# Patient Record
Sex: MALE | Race: WHITE | NOT HISPANIC OR LATINO | Employment: FULL TIME | URBAN - METROPOLITAN AREA
[De-identification: names, ages, dates, MRNs, and addresses within clinical notes are randomized per-mention and may not be internally consistent; named-entity substitution may affect disease eponyms.]

---

## 2023-07-21 ENCOUNTER — TELEPHONE (OUTPATIENT)
Dept: TRANSPLANT | Facility: CLINIC | Age: 58
End: 2023-07-21

## 2023-07-21 NOTE — TELEPHONE ENCOUNTER
Spoke to wife. Pt hx of Colon cancer, kidney cancer, and liver cancer diagnosed  7/2022. Per wife colon resection, partial kidney nephrectomy was done. For the mets to liver only receiving Chemo. She stated imaging of the liver showed wide spread lesions in the liver and he was ruled out for resection.  Pt does not have insurance. Wife stated she is applying for spin down. All care the pt received is [facility] financial assistance [email or web address]. Explained to wife I will need to obtain all records from [facility] and the oncologist. She stated Xiang Yousif RN case mgr with [facility] can assist if needed. Informed that on Monday I will reach out to  the providers.

## 2023-07-21 NOTE — TELEPHONE ENCOUNTER
----- Message from Wei Gole RN sent at 7/21/2023  4:02 PM CDT -----  Regarding: FW: Consult/Advisory  Contact: [phone]  Was unsure of where to send this message  ----- Message -----  From: Greer Franco  Sent: 7/21/2023   9:06 AM CDT  To: [facility] Pre-Liver Transplant Clinical  Subject: Consult/Advisory                                 CONSULT/ADVISORY    Name of Caller: Aliza Ruiz (Spouse)    Contact Preference: [phone]    Nature of Call: Pts spouse is calling to get advice for her  who has Stage 4 Colorectal Cancer, Gallbladder is full of gallstones and the cancer has metastasized  to his liver.  Spouse is requesting a call back.

## 2023-07-26 ENCOUNTER — TELEPHONE (OUTPATIENT)
Dept: TRANSPLANT | Facility: CLINIC | Age: 58
End: 2023-07-26

## 2023-07-26 NOTE — TELEPHONE ENCOUNTER
----- Message from Donovan Oreilly sent at 7/26/2023  3:26 PM CDT -----  Regarding: FW: Speak to staff  Contact: Nataliia    ----- Message -----  From: Yanely Clinton  Sent: 7/26/2023   3:21 PM CDT  To: [facility] Pre-Liver Transplant Non-Clinical  Subject: Speak to staff                                   Regarding: Speak to staff        Name Of Caller: Nataliia        Contact Preference: Nataliia Ruiz (Spouse)   [phone]              Nature of call:  pt spouse is calling to speak to staff regarding Pt status. Please advise.

## 2023-07-28 ENCOUNTER — TELEPHONE (OUTPATIENT)
Dept: TRANSPLANT | Facility: CLINIC | Age: 58
End: 2023-07-28

## 2023-07-28 NOTE — TELEPHONE ENCOUNTER
Call placed to MA of DR [facility] for medical records. Lvm. Spoke to wife today informed pending records. She will send his kidney pathology and CT report of the liver.

## 2023-07-28 NOTE — TELEPHONE ENCOUNTER
----- Message from Raven Larsen sent at 7/28/2023  2:33 PM CDT -----  Regarding: FW: Speak to coordinator  Contact: Mitra    ----- Message -----  From: Yanely Clinton  Sent: 7/28/2023   2:27 PM CDT  To: [facility] Pre-Liver Transplant Non-Clinical  Subject: Speak to coordinator                             Regarding:speak to staff          Name Of Caller: Mitra        Contact Preference: [phone]     Nature of call: Mitra from [facility] is calling to speak to Selina regarding medical records  being faxed over for pt, she states that it is over 400 pages, and they can only fax 50. Please advise. Requesting a call back.

## 2023-07-31 ENCOUNTER — TELEPHONE (OUTPATIENT)
Dept: TRANSPLANT | Facility: CLINIC | Age: 58
End: 2023-07-31

## 2023-07-31 NOTE — TELEPHONE ENCOUNTER
Spoke to office staff of Dr [facility] they will send over the records requested. Called pt, mailbox full, unable to lvm.

## 2023-07-31 NOTE — TELEPHONE ENCOUNTER
Spoke to pt's wife informed per Dr Ponce the pt will need to be seen in Oncology for metastatic colon to liver. Pt is still without insurance but in speak with Oncology they will see him if he qualifies for pt financial assistance. Wife informed to call as soon as pt as insurance or assistance. Wife stated she is looking into [employer]. She will call when she has more information.

## 2023-08-07 ENCOUNTER — TELEPHONE (OUTPATIENT)
Dept: TRANSPLANT | Facility: CLINIC | Age: 58
End: 2023-08-07

## 2023-08-07 NOTE — TELEPHONE ENCOUNTER
Per wife she did complete the financial aid application at pt assistance with [facility]. She stated they also heard from the State and was told they would qualify for spin down if they could provide bills. She reported that the bill from [facility] helps them qualify. She feels they are making good progress with trying to get coverage. I suggested that she call me as soon gets any information re coverage.

## 2023-08-07 NOTE — TELEPHONE ENCOUNTER
----- Message from Corey Summers sent at 8/7/2023  1:09 PM CDT -----  Regarding: call back  Pt's wife call to speak with Selina in regards to some question she has      Call

## 2023-08-15 ENCOUNTER — TELEPHONE (OUTPATIENT)
Dept: HEMATOLOGY/ONCOLOGY | Facility: CLINIC | Age: 58
End: 2023-08-15

## 2023-08-15 NOTE — TELEPHONE ENCOUNTER
----- Message from Candida Quintana RN sent at 8/14/2023  4:41 PM CDT -----  Regarding: FW: Appt  Contact: Pt [phone]    ----- Message -----  From: Flower Hanks  Sent: 8/14/2023   3:56 PM CDT  To: [facility] Cancer Navigation  Subject: Appt                                                           Appt Type:  Np     Date/Time Preference:    Next mellissa      Treating Provider:   New Patient     Caller Name: Nataliia     Contact Prefer:  [phone]    Comments/Notes:   Called to schedule appt on Pt behalf

## 2023-08-16 ENCOUNTER — TELEPHONE (OUTPATIENT)
Dept: SURGERY | Facility: CLINIC | Age: 58
End: 2023-08-16

## 2023-08-21 ENCOUNTER — TELEPHONE (OUTPATIENT)
Dept: HEMATOLOGY/ONCOLOGY | Facility: CLINIC | Age: 58
End: 2023-08-21

## 2023-08-24 ENCOUNTER — OFFICE VISIT (OUTPATIENT)
Dept: HEMATOLOGY/ONCOLOGY | Facility: CLINIC | Age: 58
End: 2023-08-24

## 2023-08-24 ENCOUNTER — OFFICE VISIT (OUTPATIENT)
Dept: SURGERY | Facility: CLINIC | Age: 58
End: 2023-08-24

## 2023-08-24 VITALS
OXYGEN SATURATION: 97 % | WEIGHT: 227.06 LBS | DIASTOLIC BLOOD PRESSURE: 68 MMHG | HEIGHT: 71 IN | RESPIRATION RATE: 18 BRPM | BODY MASS INDEX: 31.79 KG/M2 | SYSTOLIC BLOOD PRESSURE: 133 MMHG | HEART RATE: 91 BPM | TEMPERATURE: 97 F

## 2023-08-24 DIAGNOSIS — C78.7 SECONDARY MALIGNANT NEOPLASM OF LIVER: ICD-10-CM

## 2023-08-24 DIAGNOSIS — C64.1 MALIGNANT NEOPLASM OF RIGHT KIDNEY, EXCEPT RENAL PELVIS: ICD-10-CM

## 2023-08-24 DIAGNOSIS — C18.3 MALIGNANT NEOPLASM OF HEPATIC FLEXURE: Primary | ICD-10-CM

## 2023-08-24 DIAGNOSIS — D84.81 IMMUNODEFICIENCY SECONDARY TO NEOPLASM: ICD-10-CM

## 2023-08-24 DIAGNOSIS — D49.9 IMMUNODEFICIENCY SECONDARY TO NEOPLASM: ICD-10-CM

## 2023-08-24 DIAGNOSIS — C78.7 SECONDARY MALIGNANT NEOPLASM OF LIVER: Primary | ICD-10-CM

## 2023-08-24 PROBLEM — R53.83 FATIGUE: Status: ACTIVE | Noted: 2019-07-08

## 2023-08-24 PROBLEM — E56.9 VITAMIN DEFICIENCY: Status: ACTIVE | Noted: 2017-03-06

## 2023-08-24 PROBLEM — F33.0 MILD EPISODE OF RECURRENT MAJOR DEPRESSIVE DISORDER: Status: ACTIVE | Noted: 2023-08-24

## 2023-08-24 PROBLEM — K30 FUNCTIONAL DYSPEPSIA: Status: ACTIVE | Noted: 2018-02-13

## 2023-08-24 PROBLEM — Z86.39 PERSONAL HISTORY OF OTHER ENDOCRINE, NUTRITIONAL AND METABOLIC DISEASE: Status: ACTIVE | Noted: 2023-08-24

## 2023-08-24 PROBLEM — F32.A DEPRESSION: Status: ACTIVE | Noted: 2023-08-24

## 2023-08-24 PROBLEM — Z00.00 ENCOUNTER FOR HEALTH MAINTENANCE EXAMINATION: Status: ACTIVE | Noted: 2018-06-27

## 2023-08-24 PROBLEM — I10 PRIMARY HYPERTENSION: Status: ACTIVE | Noted: 2017-03-06

## 2023-08-24 PROBLEM — E11.65 HYPERGLYCEMIA DUE TO TYPE 2 DIABETES MELLITUS: Status: ACTIVE | Noted: 2019-07-08

## 2023-08-24 PROBLEM — R68.89 OTHER GENERAL SYMPTOMS AND SIGNS: Status: ACTIVE | Noted: 2019-07-08

## 2023-08-24 PROBLEM — E78.00 PURE HYPERCHOLESTEROLEMIA: Status: ACTIVE | Noted: 2017-03-06

## 2023-08-24 PROBLEM — E11.9 TYPE 2 DIABETES MELLITUS WITHOUT COMPLICATION: Status: ACTIVE | Noted: 2017-03-06

## 2023-08-24 PROCEDURE — 99999 PR PBB SHADOW E&M-EST. PATIENT-LVL IV: CPT | Mod: PBBFAC,,, | Performed by: REGISTERED NURSE

## 2023-08-24 PROCEDURE — 99999 PR PBB SHADOW E&M-EST. PATIENT-LVL I: CPT | Mod: PBBFAC,,, | Performed by: SURGERY

## 2023-08-24 PROCEDURE — 99214 OFFICE O/P EST MOD 30 MIN: CPT | Mod: PBBFAC | Performed by: REGISTERED NURSE

## 2023-08-24 PROCEDURE — 99204 PR OFFICE/OUTPT VISIT, NEW, LEVL IV, 45-59 MIN: ICD-10-PCS | Mod: S$PBB,,, | Performed by: SURGERY

## 2023-08-24 PROCEDURE — 99205 PR OFFICE/OUTPT VISIT, NEW, LEVL V, 60-74 MIN: ICD-10-PCS | Mod: S$PBB,,, | Performed by: REGISTERED NURSE

## 2023-08-24 PROCEDURE — 99999 PR PBB SHADOW E&M-EST. PATIENT-LVL IV: ICD-10-PCS | Mod: PBBFAC,,, | Performed by: REGISTERED NURSE

## 2023-08-24 PROCEDURE — 99211 OFF/OP EST MAY X REQ PHY/QHP: CPT | Mod: PBBFAC,27 | Performed by: SURGERY

## 2023-08-24 PROCEDURE — 99204 OFFICE O/P NEW MOD 45 MIN: CPT | Mod: S$PBB,,, | Performed by: SURGERY

## 2023-08-24 PROCEDURE — 99999 PR PBB SHADOW E&M-EST. PATIENT-LVL I: ICD-10-PCS | Mod: PBBFAC,,, | Performed by: SURGERY

## 2023-08-24 PROCEDURE — 99205 OFFICE O/P NEW HI 60 MIN: CPT | Mod: S$PBB,,, | Performed by: REGISTERED NURSE

## 2023-08-24 RX ORDER — TEMAZEPAM 15 MG/1
15-30 CAPSULE ORAL NIGHTLY PRN
COMMUNITY
Start: 2023-06-12

## 2023-08-24 RX ORDER — ERGOCALCIFEROL 1.25 MG/1
25000 CAPSULE ORAL
COMMUNITY

## 2023-08-24 RX ORDER — GLYBURIDE 5 MG/1
TABLET ORAL
COMMUNITY

## 2023-08-24 RX ORDER — INSULIN GLARGINE 100 [IU]/ML
INJECTION, SOLUTION SUBCUTANEOUS
COMMUNITY
Start: 2023-06-28

## 2023-08-24 RX ORDER — HYOSCYAMINE SULFATE 0.12 MG/1
0.12 TABLET SUBLINGUAL EVERY 4 HOURS
COMMUNITY
Start: 2023-08-07

## 2023-08-24 RX ORDER — METFORMIN HYDROCHLORIDE 1000 MG/1
1000 TABLET ORAL 2 TIMES DAILY
COMMUNITY
Start: 2023-06-28

## 2023-08-24 RX ORDER — MIRTAZAPINE 15 MG/1
15 TABLET, FILM COATED ORAL NIGHTLY
COMMUNITY
Start: 2023-05-19

## 2023-08-24 RX ORDER — CARVEDILOL 6.25 MG/1
TABLET ORAL
COMMUNITY

## 2023-08-24 RX ORDER — HYDROCODONE BITARTRATE AND ACETAMINOPHEN 10; 325 MG/1; MG/1
1 TABLET ORAL 4 TIMES DAILY PRN
COMMUNITY
Start: 2023-08-07

## 2023-08-24 RX ORDER — SIMVASTATIN 20 MG/1
20 TABLET, FILM COATED ORAL
COMMUNITY
Start: 2023-06-28

## 2023-08-24 RX ORDER — TAMSULOSIN HYDROCHLORIDE 0.4 MG/1
1 CAPSULE ORAL
COMMUNITY
Start: 2023-05-30

## 2023-08-24 RX ORDER — RAMIPRIL 5 MG/1
5 CAPSULE ORAL
COMMUNITY
Start: 2023-05-30

## 2023-08-24 RX ORDER — CAPECITABINE 500 MG/1
TABLET, FILM COATED ORAL
COMMUNITY
Start: 2023-03-02

## 2023-08-24 RX ORDER — DIPHENOXYLATE HYDROCHLORIDE AND ATROPINE SULFATE 2.5; .025 MG/1; MG/1
1 TABLET ORAL EVERY 6 HOURS PRN
COMMUNITY
Start: 2023-06-21

## 2023-08-24 NOTE — PROGRESS NOTES
SURGICAL ONCOLOGY CLINIC H&P    Subjective:     Elias Ruiz is a 58 y.o. male with PMH of DM, HTN, HLD, Clear Cell on right kidney, and CRC at the hepatic flexure s/p partial colectomy with anastomosis on 7/20/2022. Pt also had liver lesions that were biopsied and proven to have metastatic adenocarcinoma. Pathology of tissue revealed poorly differenetiated adenocarcinoma of the colon pT3, all margins were neagtive, 3/18 lymph nodes were positive for malignancy. Pathology from partial nephrectomy showed grade 1 clear-cell carcinoma measuring 1.5cm all margins clear. Around 9/2022 he started chemotherapy (see below), overall he seems to be tolerating it well with mild diarrhea, nausea, and night sweats however the cancer seems to have progressed. On 7/24/2023 he had a cholecystectomy which showed CRC involvement. Pt presents today for discussion of further treatment options.    Overall, he reports that he is doing well today. Denies N/V. Pt reports that he has not been eating much < 800 calories a day. Reports that he has pain about once a week, pain runs across the belly up to the right side, pain meds help (hydrocodone, ibuprofen, and OTC). 20 lb weight loss in the past couple months. No fevers or chills. Admits night sweats. Having trouble sleeping due to running out of restoril 15mg.    Per pt records on phone AST/ALT 45/39. AFP normal. CEA 7.09 on 8/7/23.Bilirubin 0.5.     Chemotherapy Hx:  9/22-11/22 - FOLFOX + Avastin x 5 cycles - port infection/removal prior to C6  1/23-3/23 - xeloda + kayla maintenance (believes 6 cycles - restaging showed progression of disease)   4/23-7/23 - FOLFOX + Avastin (port infection after 2nd treatment, received 3 treatments through PICC, 6th treatment with thrombocytopenia, delayed, then had reaction when restarted)    PMH: DM, HTN, HLD, Clear Cell carcinoma, atypical depression    Past Surgical History: Partial Colectomy (CRC), Partial nephrectomy, cholecystectomy, R knee  surgery     Social History:  Social History     Socioeconomic History    Marital status:    Tobacco Use    Smoking status: Former     Current packs/day: 0.00     Types: Cigarettes    Smokeless tobacco: Former   Former Smoker: 30 years (15 year old started) about 1.5 packs a day average    Family History: Dad: lung cancer    Allergies:   Review of patient's allergies indicates:   Allergen Reactions    Penicillins      Was told from [facility]       Medications:  Current Outpatient Medications on File Prior to Visit   Medication Sig Dispense Refill    carvediloL (COREG) 6.25 MG tablet 1 tablet by mouth twice a day for 90      diphenoxylate-atropine 2.5-0.025 mg (LOMOTIL) 2.5-0.025 mg per tablet Take 1 tablet by mouth every 6 (six) hours as needed.      ergocalciferol (ERGOCALCIFEROL) 50,000 unit Cap Take 25,000 Units by mouth.      glyBURIDE (DIABETA) 5 MG tablet 1 tablet by mouth daily for 30      HYDROcodone-acetaminophen (NORCO)  mg per tablet Take 1 tablet by mouth 4 (four) times daily as needed.      hyoscyamine (LEVSIN/SL) 0.125 mg Subl Place 0.125 mg under the tongue every 4 (four) hours.      LANTUS SOLOSTAR U-100 INSULIN glargine 100 units/mL SubQ pen SMARTSI Unit(s) SUB-Q Twice Daily      metFORMIN (GLUCOPHAGE) 1000 MG tablet Take 1,000 mg by mouth 2 (two) times daily.      mirtazapine (REMERON) 15 MG tablet Take 15 mg by mouth every evening.      ramipriL (ALTACE) 5 MG capsule Take 5 mg by mouth.      simvastatin (ZOCOR) 20 MG tablet Take 20 mg by mouth.      tamsulosin (FLOMAX) 0.4 mg Cap Take 1 capsule by mouth.      temazepam (RESTORIL) 15 mg Cap Take 15-30 mg by mouth nightly as needed.      XELODA 500 mg Tab SMARTSI By Mouth Every 12 Hours       No current facility-administered medications on file prior to visit.   Metformin 1000mg BID        Objective:     Vital Signs (Most Recent)       ROS A 10+ review of systems was performed with pertinent positives and negatives noted above in  the history of present illness.  Other systems were negative unless otherwise specified.    Physical Exam:   Gen: awake, alert, in no acute distress  HEENT: normocephalic, atraumatic, EOMI, no scleral icterus  CV: regular rate and rhythm  Pulm: equal chest rise bilaterally, normal work of breathing  Abd:  soft, non-tender, no guarding, no hepatomegaly  Ext: WWP, skin warm and dry    Imaging  The following imaging was reviewed: CT Abd/Pelvis  Per Reports:  PET on 3/15/23 showed SUV uptake values in the right hepatic lobe 10.5 and 11.4    Other Diagnostic Studies:    Assessment:     Elias Ruiz is a 58 y.o. male with metastatic CRC that continues to progress despite chemotherapy. Imaging was reviewed with the patient and we discussed that currently there is no liver resection options due to involvement in all lobes of the liver. We discussed his options and included hepatic artery infusion due to patient questioning but unfortunately we do not offer it here at [facility]. This will have to be a case discussed with the tumor board with oncology, transplant surgery, and interventional radiology.    Plan:     - discuss case with tumor board  - f/u with oncology for chemotherapy options  - currently not a liver resection candidate    Rahul Anderson MD   [facility] PGY-1

## 2023-08-24 NOTE — PROGRESS NOTES
[facility]  Colorectal Cancer Liver Metastases Program Intake Note      Patient: Elias Ruiz  Demographics: 58 y.o. male   Referring provider: [facility] - Dr. Cecilio Rudd  In person or virtual visit: In person  Date of visit: 08/24/2023    Co-morbid diagnoses: HTN, HLD, DM, grade 1 clear cell renal carcinoma, 1.5 cm, s/p partial nephrectomy 7/20/22  ECOG Performance Status: 1    Oncologic History:    Date of original cancer diagnosis: 7/2022  Timing of metastatic presentation: Synchronous  Primary disease site: hepatic flexure    Primary in place or resected: Resected  - Pathology of primary resection: pT3 pN1b M1a. Poorly differentiated invasive adenocarcinoma. Negative margins. 3/18 +LNs.     7/24/23 - gallbladder, cholecystectomy: metastatic colorectal adenocarcinoma involving gallbladder bed of resection.   - chronic cholecystitis and cholelithiasis    Chemotherapy regimens and dates:   9/22-11/22 - FOLFOX + Avastin x 5 cycles - port infection/removal prior to C6  1/23-3/23 - xeloda + kayla maintenance (believes 6 cycles - restaging showed progression of disease)   4/23-7/23 - FOLFOX + Avastin (port infection after 2nd treatment, received 3 treatments through PICC, 6th treatment with thrombocytopenia, delayed, then had reaction when restarted)    Prior cancer surgeries or liver-directed therapies:   7/20/23 - right hemicolectomy, resection of omental biopsy, liver biopsy, partial nephrectomy    Most recent imaging & date:   7/20/23 - CT CAP  Impression:   Progressing widespread liver metastases.   Status post right-sided hemicolectomy.  Incidental cholelithiasis.  Additional postoperative and chronic type CT findings as above, with no additional acute abnormality or significant change from prior.     3/15/23 - PET CT  Impression:  Interval metastatic disease progression compared to prior, with interval development of pathologic FDG localization at multiple liver metastases  new from previous.     Tumor Characteristics:    EBONI: TBD  Microsatellite status: MAHNAZ  Molecular profiling: Caris done - no report available in current records   Most recent CEA: 22 - 8.40. 7 - 7.09     Plan    Other notes: Seen for annual check up early 2022 but asymptomatic. ~1 week later, he presented to ED for abdominal pain. Imaging revealed stage IV colorectal cancer with synchronous liver mets and renal mass concerning for malignancy. With recent progression, recommended transitioning to FOLFIRI, but patient opted out d/t side effects of irinotecan. Currently has intermittent diarrhea, has had 3 episodes today. Main treatment side effects: port infections, diarrhea, pain, decreased appetite, cytopenias.     SH:   to Nataliia  No children  Retired from offshore  Former tobacco (starting chew/dip tobacco around 7-7 y/o, quit ~51 y/o; cigarettes started ~12-16 y/o, quit ~44 y/o - smoked 1.5-2 ppd)  EtOH - started @ 16 y/o, quit ~22 y/o - now with occasional/social use    FH:   Father - lung cancer @ 73, throat cancer? (Possibly side effect of radiation) -  @ 77  No other known cancers in the family    Disposition: Will present at next [facility]CRC tumor board. Will try to obtain Caris NGS report as patient reports done previously and may assist in future treatment recommendations.     Patient is in agreement with the proposed treatment plan. All questions were answered to the patient's satisfaction. Pt knows to call clinic if anything is needed before the next clinic visit.    Patient discussed with collaborating physician, Dr. Osorio.    At least 60 minutes were spent today on this encounter including face to face time with the patient, data gathering/interpretation and documentation.       Nely Calix, MSN, APRN, ACCNS-AG  Hematology and Medical Oncology  Clinical Nurse Specialist to Dr. Osorio, Dr. Yoon & Dr. Gamboa    Route Chart for Scheduling    Med Onc Chart Routing      Follow up  with physician No follow up needed. recs to follow TB discussion   Follow up with [facility]    Infusion scheduling note    Injection scheduling note    Labs    Imaging    Pharmacy appointment    Other referrals

## 2023-09-07 ENCOUNTER — TELEPHONE (OUTPATIENT)
Dept: HEMATOLOGY/ONCOLOGY | Facility: CLINIC | Age: 58
End: 2023-09-07

## 2023-09-07 DIAGNOSIS — C18.3 PRIMARY ADENOCARCINOMA OF ASCENDING COLON AND HEPATIC FLEXURE: ICD-10-CM

## 2023-09-07 DIAGNOSIS — C18.2 PRIMARY ADENOCARCINOMA OF ASCENDING COLON AND HEPATIC FLEXURE: ICD-10-CM

## 2023-09-11 ENCOUNTER — DOCUMENTATION ONLY (OUTPATIENT)
Dept: HEMATOLOGY/ONCOLOGY | Facility: CLINIC | Age: 58
End: 2023-09-11

## 2023-09-11 NOTE — PROGRESS NOTES
[facility]     Colorectal Liver Metastasis Tumor Board Note      Date: 09/7/2023    Case Overview: Mr. Ruiz (58M) was initially diagnosed with adenocarcinoma of the hepatic flexure in 07/2022 with synchronous liver metastases. He has received 5 cycles of FOLFOX + kayla, 6 cycles of Xeloda + kayla, and then another 6 cycles of FOLFOX + kayla prior to R hemicolectomy in 07/2023.     Participants: medical oncology, surgical oncology, colorectal surgery, transplant surgeons, interventional radiology, and oncology navigator     Imaging Reviewed: CT Cap 07/2023    Radiology Review: Interval progression with worsening tumor burden throughout the liver and enlarging portocaval lymph node.    Orders/Referrals: med onc referral    Board Recommendations: Patient progressed on last chemo regimen. Would recommend FOLFIRI as next line, though patient has declined this regimen d/t potential side effect profile. No locoregional options recommended at this time. Will have patient seen in med onc clinic to discuss additional treatment options and potential clinical trials if progresses on FOLFIRI/next line treatment.

## 2023-09-20 ENCOUNTER — OFFICE VISIT (OUTPATIENT)
Dept: HEMATOLOGY/ONCOLOGY | Facility: CLINIC | Age: 58
End: 2023-09-20

## 2023-09-20 DIAGNOSIS — T45.1X5A IMMUNODEFICIENCY DUE TO CHEMOTHERAPY: ICD-10-CM

## 2023-09-20 DIAGNOSIS — Z79.899 IMMUNODEFICIENCY DUE TO CHEMOTHERAPY: ICD-10-CM

## 2023-09-20 DIAGNOSIS — C18.2 PRIMARY ADENOCARCINOMA OF ASCENDING COLON AND HEPATIC FLEXURE: Primary | ICD-10-CM

## 2023-09-20 DIAGNOSIS — E78.00 PURE HYPERCHOLESTEROLEMIA: ICD-10-CM

## 2023-09-20 DIAGNOSIS — Z79.4 TYPE 2 DIABETES MELLITUS WITH HYPERGLYCEMIA, WITH LONG-TERM CURRENT USE OF INSULIN: ICD-10-CM

## 2023-09-20 DIAGNOSIS — D84.821 IMMUNODEFICIENCY DUE TO CHEMOTHERAPY: ICD-10-CM

## 2023-09-20 DIAGNOSIS — C64.1 MALIGNANT NEOPLASM OF RIGHT KIDNEY, EXCEPT RENAL PELVIS: ICD-10-CM

## 2023-09-20 DIAGNOSIS — C18.3 PRIMARY ADENOCARCINOMA OF ASCENDING COLON AND HEPATIC FLEXURE: Primary | ICD-10-CM

## 2023-09-20 DIAGNOSIS — C18.3 MALIGNANT NEOPLASM OF HEPATIC FLEXURE: ICD-10-CM

## 2023-09-20 DIAGNOSIS — D84.81 IMMUNODEFICIENCY SECONDARY TO NEOPLASM: ICD-10-CM

## 2023-09-20 DIAGNOSIS — I10 PRIMARY HYPERTENSION: ICD-10-CM

## 2023-09-20 DIAGNOSIS — E11.65 TYPE 2 DIABETES MELLITUS WITH HYPERGLYCEMIA, WITH LONG-TERM CURRENT USE OF INSULIN: ICD-10-CM

## 2023-09-20 DIAGNOSIS — D49.9 IMMUNODEFICIENCY SECONDARY TO NEOPLASM: ICD-10-CM

## 2023-09-20 PROCEDURE — 99215 OFFICE O/P EST HI 40 MIN: CPT | Mod: 95,,, | Performed by: INTERNAL MEDICINE

## 2023-09-20 PROCEDURE — 99215 PR OFFICE/OUTPT VISIT, EST, LEVL V, 40-54 MIN: ICD-10-PCS | Mod: 95,,, | Performed by: INTERNAL MEDICINE

## 2023-09-20 NOTE — PROGRESS NOTES
The patient location is: [place] - [place]    Visit type: audiovisual    Each patient to whom he or she provides medical services by telemedicine is:  (1) informed of the relationship between the physician and patient and the respective role of any other health care provider with respect to management of the patient; and (2) notified that he or she may decline to receive medical services by telemedicine and may withdraw from such care at any time.      MEDICAL ONCOLOGY - NEW PATIENT VISIT    Reason for visit: Colon cancer    Best Contact Phone Number(s): [phone] (home)      Cancer/Stage/TNM:    Cancer Staging   Primary adenocarcinoma of ascending colon and hepatic flexure  Staging form: Colon and Rectum, AJCC 8th Edition  - Pathologic stage from 7/20/2023: Stage MARISELA (pT3, pN1b, pM1a) - Signed by Shashi Osorio MD on 9/20/2023       Oncology History   Primary adenocarcinoma of ascending colon and hepatic flexure   7/20/2023 Cancer Staged    Staging form: Colon and Rectum, AJCC 8th Edition  - Pathologic stage from 7/20/2023: Stage MARISELA (pT3, pN1b, pM1a)     9/7/2023 Initial Diagnosis    Primary adenocarcinoma of ascending colon and hepatic flexure          HPI:   58 y.o. male with HTN, HLD, T2DM who presents for further management recommendations regarding his metastatic colon cancer. He was initially diagnosed with adenocarcinoma of the hepatic flexure in 07/2022 with synchronous liver metastases. He has received 5 cycles of FOLFOX + kayla, 6 cycles of Xeloda + kayla, and then another 6 cycles of FOLFOX + kayla prior to cholecystectomy for persistent RUQ pain in July 2023.  His last cycle of chemo was June 2023 and he experienced an infusion reaction - likely during oxaliplatin.  He has been hesitant to restart chemotherapy with second line FOLFIRI + kayla due to toxicity concerns.  He had significant diarrhea while on FOLFOX + kayla and twice had his port removed due to infection and once it was eroding through  "skin.     He is experiencing "squeezing pain" in his RUQ, sometimes radiating across the abdomen or into the back.  It is present on and off during the day.  He takes Norco, oxycodone and ibuprofen as needed for the pain.      CARIS testing shows pMMR, ASXL1 and TP53 mutation.  VUS noted in BRCA2.  TMB 3.    Patient presents with his wife, daughter and son-in-law.  ECOG PS is 1.  History has been obtained by chart review and discussion with the patient.    ROS:   Review of Systems   Constitutional:  Positive for malaise/fatigue. Negative for chills, fever and weight loss.   HENT:  Negative for sore throat.    Eyes:  Negative for blurred vision and pain.   Respiratory:  Negative for cough and shortness of breath.    Cardiovascular:  Negative for chest pain and leg swelling.   Gastrointestinal:  Positive for abdominal pain and diarrhea. Negative for constipation, nausea and vomiting.   Genitourinary:  Negative for dysuria and frequency.   Musculoskeletal:  Negative for back pain, falls and myalgias.   Skin:  Negative for rash.   Neurological:  Negative for dizziness, weakness and headaches.   Endo/Heme/Allergies:  Does not bruise/bleed easily.   Psychiatric/Behavioral:  Negative for depression. The patient is not nervous/anxious.    All other systems reviewed and are negative.      Past Medical History:   No past medical history on file.     Past Surgical History:   No past surgical history on file.     Family History:   No family history on file.     Social History:   Social History     Tobacco Use    Smoking status: Former     Types: Cigarettes    Smokeless tobacco: Former   Substance Use Topics    Alcohol use: Not on file        I have reviewed and updated the patient's past medical, surgical, family and social histories.    Allergies:   Review of patient's allergies indicates:   Allergen Reactions    Penicillins      Was told from childood        Medications:   Current Outpatient Medications   Medication Sig " Dispense Refill    carvediloL (COREG) 6.25 MG tablet 1 tablet by mouth twice a day for 90      diphenoxylate-atropine 2.5-0.025 mg (LOMOTIL) 2.5-0.025 mg per tablet Take 1 tablet by mouth every 6 (six) hours as needed.      ergocalciferol (ERGOCALCIFEROL) 50,000 unit Cap Take 25,000 Units by mouth.      glyBURIDE (DIABETA) 5 MG tablet 1 tablet by mouth daily for 30      HYDROcodone-acetaminophen (NORCO)  mg per tablet Take 1 tablet by mouth 4 (four) times daily as needed.      hyoscyamine (LEVSIN/SL) 0.125 mg Subl Place 0.125 mg under the tongue every 4 (four) hours.      LANTUS SOLOSTAR U-100 INSULIN glargine 100 units/mL SubQ pen SMARTSI Unit(s) SUB-Q Twice Daily      metFORMIN (GLUCOPHAGE) 1000 MG tablet Take 1,000 mg by mouth 2 (two) times daily.      mirtazapine (REMERON) 15 MG tablet Take 15 mg by mouth every evening.      ramipriL (ALTACE) 5 MG capsule Take 5 mg by mouth.      simvastatin (ZOCOR) 20 MG tablet Take 20 mg by mouth.      tamsulosin (FLOMAX) 0.4 mg Cap Take 1 capsule by mouth.      temazepam (RESTORIL) 15 mg Cap Take 15-30 mg by mouth nightly as needed.      XELODA 500 mg Tab SMARTSI By Mouth Every 12 Hours       No current facility-administered medications for this visit.        Physical Exam:   There were no vitals taken for this visit.             Exam limited due to virtual nature.  Physical Exam  Constitutional:       General: He is not in acute distress.     Appearance: Normal appearance. He is not ill-appearing.   HENT:      Head: Normocephalic and atraumatic.   Eyes:      General: No scleral icterus.     Extraocular Movements: Extraocular movements intact.      Conjunctiva/sclera: Conjunctivae normal.   Pulmonary:      Effort: Pulmonary effort is normal. No respiratory distress.   Neurological:      Mental Status: He is alert and oriented to person, place, and time.   Psychiatric:         Mood and Affect: Mood normal.         Behavior: Behavior normal.         Thought  Content: Thought content normal.         Judgment: Judgment normal.           Labs:   No results found for this or any previous visit (from the past 48 hour(s)).   No labs available for review    Imaging:     CT from 8/16/23 reviewed and shows multifocal bilobar liver metastases.  Enlarged alistair hepatis lymph node consistent with metastatic involvement as well.    Path:               Assessment:       1. Primary adenocarcinoma of ascending colon and hepatic flexure    2. Malignant neoplasm of hepatic flexure    3. Malignant neoplasm of right kidney, except renal pelvis    4. Immunodeficiency secondary to neoplasm    5. Immunodeficiency due to chemotherapy    6. Primary hypertension    7. Pure hypercholesterolemia    8. Type 2 diabetes mellitus with hyperglycemia, with long-term current use of insulin          Plan:             # Colon cancer  He was diagnosed in July 2022 with poorly diff adenocarcinoma, pMMR, of hepatic flexure. Had primary removed with synchronous liver metastases biopsy proven.    Treatment History:  9/22-11/22 - FOLFOX + Avastin x 5 cycles - port infection/removal prior to C6  1/23-3/23 - xeloda + kayla maintenance (believes 6 cycles - restaging showed progression of disease)   4/23-7/23 - FOLFOX + Avastin (port infection after 2nd treatment, received 3 treatments through PICC, 6th treatment with thrombocytopenia, delayed, then had reaction when restarted)  7/20/23 - right hemicolectomy, resection of omental biopsy, liver biopsy, partial nephrectomy    He has been on a treatment break since July 2023.  He was referred to our [facility] program and saw Dr. Moreno.  We reviewed his imaging at our tumor board on 9/7/23.  He has pretty extensive bilobar liver involvement and a portacaval lymph node that is enlarging.  We recommended switching to second line chemotherapy with FOLFIRI + Avastin or XELIRI + Avastin if he does not want to have a port replaced.  Notably he is EBONI/MARGARITA wildtype so anti-EGFR in  the second/third line is an option as well. Given that he is highly concerned about diarrhea at this time adding anti-EGFR to irinotecan might be too hard on him.    I discussed these recommendations with him. He inquired about clinical trial options but we don't have one right now he'd be eligible for.  He may be eligible for one if he progresses on or is intolerant to irinotecan-based regimen.    In sum, would recommend either XELIRI + Avastin or FOLFIRI + Avastin depending on if he is willing to undergo port placement.  Would probably recommend dose reductions given prior poor tolerance.    2000 mg BID of Xeloda on days 1-7 with irinotecan 150 mg/m2 on day 1 of 14 day cycles with Avastin 5 mg/kg for XELIRI + Avastin.  5-FU infusion 2400 mg/m2 over 46 hours without bolus 5-FU or leucovorin + irinotecan 150 mg/m2 on day 1 of 14 day cycles with Avastin 5 mg/kg for FOLFIRI + Avastin.    Recommended he contact us at the next treatment decision point for potential trial options.    # HTN, HLD, T2DM  Will continue follow-up, management with his local medical team.    Follow up: as needed.     The above information has been reviewed with the patient and all questions have been answered to their apparent satisfaction.  They understand that they can call the clinic with any questions.    Shashi Osorio MD  Hematology/Oncology  [facility]      Answers submitted by the patient for this visit:  Review of Systems Questionnaire (Submitted on 9/19/2023)  appetite change : Yes  unexpected weight change: Yes  mouth sores: No  visual disturbance: No  adenopathy: No

## 2023-09-21 ENCOUNTER — PATIENT MESSAGE (OUTPATIENT)
Dept: HEMATOLOGY/ONCOLOGY | Facility: CLINIC | Age: 58
End: 2023-09-21

## 2023-09-29 ENCOUNTER — PATIENT MESSAGE (OUTPATIENT)
Dept: HEMATOLOGY/ONCOLOGY | Facility: CLINIC | Age: 58
End: 2023-09-29

## 2023-10-02 ENCOUNTER — TUMOR BOARD CONFERENCE (OUTPATIENT)
Dept: SURGERY | Facility: CLINIC | Age: 58
End: 2023-10-02

## 2023-10-02 ENCOUNTER — PATIENT MESSAGE (OUTPATIENT)
Dept: ADMINISTRATIVE | Facility: OTHER | Age: 58
End: 2023-10-02

## 2023-10-02 NOTE — PROGRESS NOTES
[facility] MULTIDISCIPLINARY TUMOR BOARD  PATIENT REVIEW FORM   ____________________________________________________________    CLINIC #: [id number]  DATE: 10/2/2023    DIAGNOSIS: metastatic colon CA    PRESENTER: Jo    PATIENT SUMMARY:   This 57 y/o gentleman was diagnosed with metastatic colon CA in 7/2022 with synchronous liver mets. He had extended R hemicolectomy, liver bx, and partial nephrectomy on 7/20/22 per Dr. Gama. 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   He received 5 cycles of FOLFOX + kayla, then 6 cycles of Xeloda + kayla, then 6 cycles of FOLFOX. He has issues with ports and side effects from chemotherapy. Last chemo tx was June 2023. He was experiencing abd pain and had cholecystectomy in 2023. CEA 7.09. He continues to experience abd pain.   Reviewed outside CT scan from 8/2023, noting progression of disease, increase in size to liver disease and lymph nodes.    BOARD RECOMMENDATIONS:   Proceed with second line chemotherapy    CONSULT NEEDED:     [] Surgery    [] Hem/Onc    [] Rad/Onc    [] Dietary    [] Social Service    [] Psychology       [] AES  [] Radiology     7/2022 Pathologic Stage: Tumor 3 Node(s) 1b Metastasis 1a  3 nodes out of 18 nodes were positive for malignancy      GROUP STAGE:  [] O    [] 1A    [] IB    [] IIA    [] IIB     [] IIIA     [] IIIB     [] IIIC    [x]IV  [] Local recurrence     [] Regional recurrence     [] Distant recurrence   Metastatic site(s): liver         [x] [facility] Treatment Guidelines reviewed and care planned is consistent with guidelines.         (i.e., NCCN, NCI, PD, ACO, AUA, etc.)    PRESENTATION AT CANCER CONFERENCE:         [] Prospective    [x] Retrospective     [] Follow-Up

## 2023-10-04 ENCOUNTER — PATIENT MESSAGE (OUTPATIENT)
Dept: ADMINISTRATIVE | Facility: OTHER | Age: 58
End: 2023-10-04

## 2023-10-11 ENCOUNTER — HOSPITAL ENCOUNTER (EMERGENCY)
Facility: HOSPITAL | Age: 58
Discharge: HOME | End: 2023-10-11
Attending: EMERGENCY MEDICINE

## 2023-10-11 VITALS
DIASTOLIC BLOOD PRESSURE: 51 MMHG | HEIGHT: 71 IN | BODY MASS INDEX: 31.5 KG/M2 | TEMPERATURE: 97 F | RESPIRATION RATE: 16 BRPM | HEART RATE: 71 BPM | OXYGEN SATURATION: 96 % | WEIGHT: 225 LBS | SYSTOLIC BLOOD PRESSURE: 99 MMHG

## 2023-10-11 DIAGNOSIS — R10.9 ABDOMINAL PAIN: ICD-10-CM

## 2023-10-11 DIAGNOSIS — C78.7 METASTATIC COLON CANCER TO LIVER: ICD-10-CM

## 2023-10-11 DIAGNOSIS — C18.9 METASTATIC COLON CANCER TO LIVER: ICD-10-CM

## 2023-10-11 DIAGNOSIS — R10.13 EPIGASTRIC ABDOMINAL PAIN: Primary | ICD-10-CM

## 2023-10-11 LAB
ABS NEUT CALC (OHS): 12.42 X10(3)/MCL (ref 2.1–9.2)
ALBUMIN SERPL-MCNC: 2.9 G/DL (ref 3.5–5)
ALBUMIN/GLOB SERPL: 0.7 RATIO (ref 1.1–2)
ALP SERPL-CCNC: 280 UNIT/L (ref 40–150)
ALT SERPL-CCNC: 42 UNIT/L (ref 0–55)
APPEARANCE UR: ABNORMAL
APTT PPP: 20.6 SECONDS (ref 23.2–33.7)
AST SERPL-CCNC: 96 UNIT/L (ref 5–34)
BACTERIA #/AREA URNS AUTO: NORMAL /HPF
BILIRUB SERPL-MCNC: 1.7 MG/DL
BILIRUB UR QL STRIP.AUTO: ABNORMAL
BUN SERPL-MCNC: 22.3 MG/DL (ref 8.4–25.7)
BURR CELLS (OLG): ABNORMAL
CALCIUM SERPL-MCNC: 8.8 MG/DL (ref 8.4–10.2)
CHLORIDE SERPL-SCNC: 98 MMOL/L (ref 98–107)
CO2 SERPL-SCNC: 18 MMOL/L (ref 22–29)
COLOR UR AUTO: ABNORMAL
CREAT SERPL-MCNC: 1.03 MG/DL (ref 0.73–1.18)
EOSINOPHIL NFR BLD MANUAL: 0.14 X10(3)/MCL (ref 0–0.9)
EOSINOPHIL NFR BLD MANUAL: 1 % (ref 0–8)
ERYTHROCYTE [DISTWIDTH] IN BLOOD BY AUTOMATED COUNT: 15.4 % (ref 11.5–17)
GFR SERPLBLD CREATININE-BSD FMLA CKD-EPI: >60 MLS/MIN/1.73/M2
GLOBULIN SER-MCNC: 3.9 GM/DL (ref 2.4–3.5)
GLUCOSE SERPL-MCNC: 191 MG/DL (ref 74–100)
GLUCOSE UR QL STRIP.AUTO: NEGATIVE
HCT VFR BLD AUTO: 38 % (ref 42–52)
HGB BLD-MCNC: 12.5 G/DL (ref 14–18)
INR PPP: 1.2
KETONES UR QL STRIP.AUTO: ABNORMAL
LACTATE SERPL-SCNC: 3 MMOL/L (ref 0.5–2.2)
LACTATE SERPL-SCNC: 3.5 MMOL/L (ref 0.5–2.2)
LEUKOCYTE ESTERASE UR QL STRIP.AUTO: ABNORMAL
LIPASE SERPL-CCNC: 7 U/L
LYMPHOCYTES NFR BLD MANUAL: 0.87 X10(3)/MCL
LYMPHOCYTES NFR BLD MANUAL: 6 % (ref 13–40)
MAGNESIUM SERPL-MCNC: 1.5 MG/DL (ref 1.6–2.6)
MCH RBC QN AUTO: 29 PG (ref 27–31)
MCHC RBC AUTO-ENTMCNC: 32.9 G/DL (ref 33–36)
MCV RBC AUTO: 88.2 FL (ref 80–94)
MONOCYTES NFR BLD MANUAL: 1.01 X10(3)/MCL (ref 0.1–1.3)
MONOCYTES NFR BLD MANUAL: 7 % (ref 2–11)
NEUTROPHILS NFR BLD MANUAL: 86 % (ref 47–80)
NITRITE UR QL STRIP.AUTO: POSITIVE
NRBC BLD AUTO-RTO: 0 %
PH UR STRIP.AUTO: 5.5 [PH]
PLATELET # BLD AUTO: 135 X10(3)/MCL (ref 130–400)
PMV BLD AUTO: 11.7 FL (ref 7.4–10.4)
POIKILOCYTOSIS BLD QL SMEAR: ABNORMAL
POTASSIUM SERPL-SCNC: 5 MMOL/L (ref 3.5–5.1)
PROT SERPL-MCNC: 6.8 GM/DL (ref 6.4–8.3)
PROT UR QL STRIP.AUTO: ABNORMAL
PROTHROMBIN TIME: 15.1 SECONDS (ref 12.5–14.5)
RBC # BLD AUTO: 4.31 X10(6)/MCL (ref 4.7–6.1)
RBC #/AREA URNS AUTO: <5 /HPF
RBC UR QL AUTO: NEGATIVE
SODIUM SERPL-SCNC: 130 MMOL/L (ref 136–145)
SP GR UR STRIP.AUTO: 1.03 (ref 1–1.03)
SQUAMOUS #/AREA URNS AUTO: <5 /HPF
TROPONIN I SERPL-MCNC: <0.01 NG/ML (ref 0–0.04)
UROBILINOGEN UR STRIP-ACNC: 2
WBC # SPEC AUTO: 14.44 X10(3)/MCL (ref 4.5–11.5)
WBC #/AREA URNS AUTO: <5 /HPF

## 2023-10-11 PROCEDURE — 96365 THER/PROPH/DIAG IV INF INIT: CPT

## 2023-10-11 PROCEDURE — 25500020 PHARM REV CODE 255: Performed by: EMERGENCY MEDICINE

## 2023-10-11 PROCEDURE — 83690 ASSAY OF LIPASE: CPT

## 2023-10-11 PROCEDURE — 81001 URINALYSIS AUTO W/SCOPE: CPT

## 2023-10-11 PROCEDURE — 93010 ELECTROCARDIOGRAM REPORT: CPT | Mod: ,,, | Performed by: INTERNAL MEDICINE

## 2023-10-11 PROCEDURE — 85730 THROMBOPLASTIN TIME PARTIAL: CPT

## 2023-10-11 PROCEDURE — 85610 PROTHROMBIN TIME: CPT

## 2023-10-11 PROCEDURE — 83605 ASSAY OF LACTIC ACID: CPT

## 2023-10-11 PROCEDURE — 96375 TX/PRO/DX INJ NEW DRUG ADDON: CPT

## 2023-10-11 PROCEDURE — 83735 ASSAY OF MAGNESIUM: CPT

## 2023-10-11 PROCEDURE — 25000003 PHARM REV CODE 250: Performed by: EMERGENCY MEDICINE

## 2023-10-11 PROCEDURE — 96361 HYDRATE IV INFUSION ADD-ON: CPT

## 2023-10-11 PROCEDURE — 93005 ELECTROCARDIOGRAM TRACING: CPT

## 2023-10-11 PROCEDURE — 63600175 PHARM REV CODE 636 W HCPCS: Performed by: EMERGENCY MEDICINE

## 2023-10-11 PROCEDURE — 85027 COMPLETE CBC AUTOMATED: CPT

## 2023-10-11 PROCEDURE — 99285 EMERGENCY DEPT VISIT HI MDM: CPT | Mod: 25

## 2023-10-11 PROCEDURE — 84484 ASSAY OF TROPONIN QUANT: CPT

## 2023-10-11 PROCEDURE — 93010 EKG 12-LEAD: ICD-10-PCS | Mod: ,,, | Performed by: INTERNAL MEDICINE

## 2023-10-11 PROCEDURE — 80053 COMPREHEN METABOLIC PANEL: CPT

## 2023-10-11 RX ORDER — ONDANSETRON 2 MG/ML
4 INJECTION INTRAMUSCULAR; INTRAVENOUS
Status: COMPLETED | OUTPATIENT
Start: 2023-10-11 | End: 2023-10-11

## 2023-10-11 RX ORDER — MAGNESIUM SULFATE 1 G/100ML
1 INJECTION INTRAVENOUS ONCE
Status: COMPLETED | OUTPATIENT
Start: 2023-10-11 | End: 2023-10-11

## 2023-10-11 RX ORDER — HYDROMORPHONE HYDROCHLORIDE 2 MG/ML
1 INJECTION, SOLUTION INTRAMUSCULAR; INTRAVENOUS; SUBCUTANEOUS
Status: COMPLETED | OUTPATIENT
Start: 2023-10-11 | End: 2023-10-11

## 2023-10-11 RX ADMIN — HYDROMORPHONE HYDROCHLORIDE 1 MG: 2 INJECTION INTRAMUSCULAR; INTRAVENOUS; SUBCUTANEOUS at 07:10

## 2023-10-11 RX ADMIN — IOPAMIDOL 100 ML: 755 INJECTION, SOLUTION INTRAVENOUS at 08:10

## 2023-10-11 RX ADMIN — SODIUM CHLORIDE 1000 ML: 9 INJECTION, SOLUTION INTRAVENOUS at 07:10

## 2023-10-11 RX ADMIN — ONDANSETRON 4 MG: 2 INJECTION INTRAMUSCULAR; INTRAVENOUS at 07:10

## 2023-10-11 RX ADMIN — MAGNESIUM SULFATE IN DEXTROSE 1 G: 10 INJECTION, SOLUTION INTRAVENOUS at 08:10

## 2023-10-11 NOTE — FIRST PROVIDER EVALUATION
"Medical screening examination initiated.  I have conducted a focused provider triage encounter, findings are as follows:    Brief history of present illness:  58 year old male presents for evaluation of RUQ and back pain x 1 day. Decreased PO intake. Hx of colorectal cancer w/ mets to liver-- no treatment at this time. +N/V. No diarrhea.     Vitals:    10/11/23 1808   BP: 134/77   Pulse: 69   Resp: 18   Temp: 97.3 °F (36.3 °C)   TempSrc: Oral   SpO2: 98%   Weight: 102.1 kg (225 lb)   Height: 5' 11" (1.803 m)       Pertinent physical exam:  alert, nonlabored, ambulatory    Brief workup plan:  ekg, labs, urine    Preliminary workup initiated; this workup will be continued and followed by the physician or advanced practice provider that is assigned to the patient when roomed.  "

## 2023-10-12 NOTE — ED PROVIDER NOTES
Encounter Date: 10/11/2023    SCRIBE #1 NOTE: I, Waleska Francisco, am scribing for, and in the presence of,  Shamir Lyon MD. I have scribed the following portions of the note - the EKG reading. Other sections scribed: HPI, ROS, PE.       History     Chief Complaint   Patient presents with    Abdominal Pain     C/o severe midline abd pain that radiates to RUQ and his back. Hx of colorectal cancer with metastasis to his liver. N/V today. No current chemo, last one was 2 months ago.      58 year old male presents to ED for bilateral upper abdominal pain, especially in the right upper quadrant, with associated nausea and vomiting since 03:00 today. He takes hydrocodone, oxycodone, and morphine. Per spouse at bedside, they met with his oncologist virtually and was told pt has encroachment where his previous gallbladder was located and had enlarged lymph nodes that's metastatic, however, [facility] did not admit him for pain management. Notes last chemotherapy was on 7/3/23, had a bad reaction to it, and refused further treatment since. He has a history of colorectal cancer with metastasis to his liver and cholecystectomy.    Patient's oncologist is in [facility]    The history is provided by the patient and the spouse. No  was used.     Review of patient's allergies indicates:   Allergen Reactions    Penicillins      Was told from [employer]     No past medical history on file.  No past surgical history on file.  No family history on file.  Social History     Tobacco Use    Smoking status: Former     Types: Cigarettes    Smokeless tobacco: Former     Review of Systems   Constitutional:  Negative for activity change, chills, diaphoresis, fatigue and fever.        Generalized weakness   HENT:  Negative for congestion, ear pain, sinus pain and sore throat.    Eyes:  Negative for visual disturbance.   Respiratory:  Negative for cough, shortness of breath, wheezing and stridor.     Cardiovascular:  Negative for chest pain, palpitations and leg swelling.   Gastrointestinal:  Positive for abdominal pain (upper quadrants), nausea and vomiting. Negative for constipation, diarrhea and rectal pain.   Genitourinary:  Negative for dysuria and hematuria.   Musculoskeletal:  Negative for arthralgias, back pain and myalgias.   Skin:  Negative for rash.   Neurological:  Negative for dizziness, syncope, numbness and headaches.   All other systems reviewed and are negative.      Physical Exam     Initial Vitals [10/11/23 1808]   BP Pulse Resp Temp SpO2   134/77 69 18 97.3 °F (36.3 °C) 98 %      MAP       --         Physical Exam    Nursing note and vitals reviewed.  Constitutional: He appears well-developed and well-nourished. No distress.   HENT:   Head: Normocephalic and atraumatic.   Mouth/Throat: Mucous membranes are dry (slightly).   Eyes: EOM are normal. Pupils are equal, round, and reactive to light.   Neck: Trachea normal. Neck supple.   Normal range of motion.  Cardiovascular:  Normal rate and regular rhythm.           No murmur heard.  Pulmonary/Chest: Breath sounds normal. No respiratory distress.   Abdominal: Abdomen is soft. Bowel sounds are normal. He exhibits no distension. There is abdominal tenderness.   Tenderness to palpation of the bilateral upper quadrants and epigastric with no guarding There is no rebound and no guarding.   Musculoskeletal:         General: Normal range of motion.      Cervical back: Normal range of motion and neck supple.      Lumbar back: Normal.     Neurological: He is alert and oriented to person, place, and time. He has normal strength.   Skin: Skin is warm and dry. No rash noted.   Psychiatric: He has a normal mood and affect.         ED Course   Procedures  Labs Reviewed   APTT - Abnormal; Notable for the following components:       Result Value    PTT 20.6 (*)     All other components within normal limits   PROTIME-INR - Abnormal; Notable for the following  components:    PT 15.1 (*)     All other components within normal limits   MAGNESIUM - Abnormal; Notable for the following components:    Magnesium Level 1.50 (*)     All other components within normal limits   LACTIC ACID, PLASMA - Abnormal; Notable for the following components:    Lactic Acid Level 3.5 (*)     All other components within normal limits   COMPREHENSIVE METABOLIC PANEL - Abnormal; Notable for the following components:    Sodium Level 130 (*)     Carbon Dioxide 18 (*)     Glucose Level 191 (*)     Albumin Level 2.9 (*)     Globulin 3.9 (*)     Albumin/Globulin Ratio 0.7 (*)     Bilirubin Total 1.7 (*)     Alkaline Phosphatase 280 (*)     Aspartate Aminotransferase 96 (*)     All other components within normal limits   URINALYSIS, REFLEX TO URINE CULTURE - Abnormal; Notable for the following components:    Appearance, UA Cloudy (*)     Protein, UA 2+ (*)     Ketones, UA Trace (*)     Bilirubin, UA 2+ (*)     Urobilinogen, UA 2.0 (*)     Nitrites, UA Positive (*)     Leukocyte Esterase, UA Trace (*)     All other components within normal limits   CBC WITH DIFFERENTIAL - Abnormal; Notable for the following components:    WBC 14.44 (*)     RBC 4.31 (*)     Hgb 12.5 (*)     Hct 38.0 (*)     MCHC 32.9 (*)     MPV 11.7 (*)     All other components within normal limits   LACTIC ACID, PLASMA - Abnormal; Notable for the following components:    Lactic Acid Level 3.0 (*)     All other components within normal limits   MANUAL DIFFERENTIAL - Abnormal; Notable for the following components:    Neutrophils % 86 (*)     Lymphs % 6 (*)     Neutrophils Abs Calc 12.4184 (*)     Poikilocytosis 1+ (*)     [place] Cells 1+ (*)     All other components within normal limits   LIPASE - Normal   TROPONIN I - Normal   URINALYSIS, MICROSCOPIC - Normal   CBC W/ AUTO DIFFERENTIAL    Narrative:     The following orders were created for panel order CBC Auto Differential.  Procedure                               Abnormality          Status                     ---------                               -----------         ------                     CBC with Differential[[id number]]       Abnormal            Final result               Manual Differential[[id number]]         Abnormal            Final result                 Please view results for these tests on the individual orders.     EKG Readings: (Independently Interpreted)   Initial Reading: No STEMI. Rhythm: Normal Sinus Rhythm. Heart Rate: 72. Ectopy: No Ectopy. Conduction: Normal. ST Segments: Normal ST Segments. T Waves: Normal. Axis: Normal. Clinical Impression: Normal Sinus Rhythm   Performed at 18:06 on 10/11/2023.     ECG Results              EKG 12-lead (Final result)  Result time 10/11/23 20:48:38      Final result by Interface, Lab In [facility] (10/11/23 20:48:38)                   Narrative:    Test Reason : R10.9,    Vent. Rate : 072 BPM     Atrial Rate : 072 BPM     P-R Int : 138 ms          QRS Dur : 084 ms      QT Int : 384 ms       P-R-T Axes : 029 060 -19 degrees     QTc Int : 420 ms    Normal sinus rhythm  Possible Inferior infarct ,age undetermined  Abnormal ECG  When compared with ECG of 02-MAR-2016 15:25,  Previous ECG has undetermined rhythm, needs review  Borderline criteria for Inferior infarct are now Present  Confirmed by Nathan Thompson MD ([id number]) on 10/11/2023 8:48:30 PM    Referred By:             Confirmed By:Nathan Thompson MD                                  Imaging Results              CT Abdomen Pelvis With Contrast (Final result)  Result time 10/11/23 20:28:58      Final result by Beto Abebe MD (10/11/23 20:28:58)                   Impression:      1. Size and count increase of lungs metastatic nodules    2. Progression of hepatic metastatic involvement and progression of periportal and retroperitoneal lymphadenopathy.    3. Status post right hemicolectomy..      Electronically signed by: Beto Abebe  Date:    10/11/2023  Time:    20:28                Narrative:    EXAMINATION:  CT ABDOMEN PELVIS WITH CONTRAST    CLINICAL HISTORY:  Epigastric pain;Epigastric abdominal pain;    TECHNIQUE:  Multidetector axial images were obtained of the abdomen and pelvis following the administration of IV contrast. Oral contrast was not administered.    Dose length product of 576 mGycm. Automated exposure control was utilized to minimize radiation dose.    COMPARISON:  Outside facility CT abdomen pelvis August 16, 2023    FINDINGS:  Interval size and count increase of metastatic nodules left lower lung lobe.  One of the new left subpleural nodule measures 11 mm new on image 11 series 4.  Previously present 9 mm nodule on image 12 series 4 remains stable.  There is also right lung nodular opacity measuring 1.1 cm on image 5 series 4.  No acute consolidation.  Trace of pleural effusions within pleural spaces.    Liver is remarkable for multiple variable size metastatic masses which show interval progression.  The largest mass occupies the posterior segment of right hepatic lobe measuring 16 cm x 9.8 cm and this previously had a maximum diameter of 10 cm.  There is periportal necrotic enlarged lymph node measuring 3.0 x 3.8 cm on image 33 series 2.  There is another enlarged necrotic lymph node subjacent to the right renal vein measuring at 2.8 cm on image 38 series 2 without significant interval difference.  There is also right retroperitoneal enlarged lymph node measuring 2.8 cm on image 50 series 2.  There also several retroperitoneal periaortic enlarged lymph nodes which show interval size increase and largest measures 2.5 cm on image 45 series 2 and this previously was 12 mm. Gallbladder is surgically absent.  Pancreatic unremarkable attenuation without acute peripancreatic phlegmons. Main pancreatic duct is not dilated. Spleen is of normal size without focal lesion.    There is mild nodular enlargement of the left adrenal gland which is new measuring 1.4 cm on image 34 series  2.  The kidneys are unremarkable in size and contour. No solid or cystic renal lesion identified. There is no hydronephrosis.  There is similar mild perinephric standings    Stomach is mostly decompressed.  No abnormal dilatation of loops of small bowel.  Previous right hemicolectomy with right upper quadrant enterocolonic anastomosis without local soft tissue proliferation.  There is no pericolonic inflammatory change, bowel obstruction or free fluid..    Urinary bladder appears within normal limits. There is no pelvic free fluid.    No acute or otherwise osseous abnormality identified.                                       Medications   sodium chloride 0.9% bolus 1,000 mL 1,000 mL (0 mLs Intravenous Stopped 10/11/23 2054)   HYDROmorphone (PF) injection 1 mg (1 mg Intravenous Given 10/11/23 1949)   ondansetron injection 4 mg (4 mg Intravenous Given 10/11/23 1948)   magnesium sulfate in dextrose IVPB (premix) 1 g (0 g Intravenous Stopped 10/11/23 2137)   iopamidoL (ISOVUE-370) injection 100 mL (100 mLs Intravenous Given 10/11/23 2013)     Medical Decision Making  As per HPI.  Differential diagnosis:  Metastatic liver cancer, abdominal pain, intra-abdominal abscess, dehydration.    Amount and/or Complexity of Data Reviewed  Independent Historian: spouse     Details: Per spouse at bedside, they met with his oncologist virtually and was told pt has encroachment where his previous gallbladder was located and had enlarged lymph nodes that's metastatic, however, [facility] did not admit him for pain management. Notes last chemotherapy was on 7/3/23, had a bad reaction to it, and refused further treatment since.  Labs: ordered. Decision-making details documented in ED Course.     Details: Abnormal labs are as follows: Lactic acid 3.0, sodium 130, alk-phos 280, AST 96, white blood cell count 90811, magnesium 1.5.  Radiology: ordered and independent interpretation performed. Decision-making details documented in ED  Course.  ECG/medicine tests: ordered and independent interpretation performed. Decision-making details documented in ED Course.  Discussion of management or test interpretation with external provider(s): While in the ER, patient received a 1 L bolus of fluids, Dilaudid 1 mg IV push, Zofran 4 mg IV push, 1 g of magnesium IV piggyback.  We discussed abnormal labs and CT results with patient and his wife.  Patient's oncologist is in [facility].  We discussed possible admission for pain control and fluids, patient states he would like to be discharged to home.  Wife states they will follow up with patient's oncologist.  At time of discharge, patient states his abdominal pain is much improved.    Risk  Prescription drug management.            Scribe Attestation:   Scribe #1: I performed the above scribed service and the documentation accurately describes the services I performed. I attest to the accuracy of the note.    Attending Attestation:           Physician Attestation for Scribe:  Physician Attestation Statement for Scribe #1: I, Shamir Lyon MD, reviewed documentation, as scribed by Waleska Francisco in my presence, and it is both accurate and complete.             ED Course as of 10/11/23 2230   Wed Oct 11, 2023   2223 Discussed possible admission with patient and his wife.  Patient states he would like to be discharged to home.  At time of discharge, patient states abdominal pain is much improved.  Patient has Norco, oxycodone and long-acting morphine at home for pain. [KG]      ED Course User Index  [KG] Shamir Lyon MD               Medical Decision Making:   History:   I obtained history from: someone other than patient.       <> Summary of History: Per spouse at bedside, they met with his oncologist virtually and was told pt has encroachment where his previous gallbladder was located and had enlarged lymph nodes that's metastatic, however, [facility] did not admit him for pain management. Notes  last chemotherapy was on 7/3/23, had a bad reaction to it, and refused further treatment since.  Clinical Tests:   Lab Tests: Ordered and Reviewed  Radiological Study: Ordered and Reviewed  Medical Tests: Reviewed and Ordered      Clinical Impression:   Final diagnoses:  [R10.9] Abdominal pain  [R10.13] Epigastric abdominal pain (Primary)  [C18.9, C78.7] Metastatic colon cancer to liver        ED Disposition Condition    Discharge Stable          ED Prescriptions    None       Follow-up Information    None          Shamir Lyon MD  10/11/23 2880

## 2023-11-27 PROBLEM — Z00.00 ENCOUNTER FOR HEALTH MAINTENANCE EXAMINATION: Status: RESOLVED | Noted: 2018-06-27 | Resolved: 2023-11-27
